# Patient Record
Sex: MALE | Race: OTHER | HISPANIC OR LATINO | Employment: FULL TIME | URBAN - METROPOLITAN AREA
[De-identification: names, ages, dates, MRNs, and addresses within clinical notes are randomized per-mention and may not be internally consistent; named-entity substitution may affect disease eponyms.]

---

## 2024-03-08 ENCOUNTER — OFFICE VISIT (OUTPATIENT)
Age: 44
End: 2024-03-08

## 2024-03-08 VITALS
RESPIRATION RATE: 18 BRPM | HEIGHT: 67 IN | TEMPERATURE: 97 F | HEART RATE: 65 BPM | DIASTOLIC BLOOD PRESSURE: 67 MMHG | SYSTOLIC BLOOD PRESSURE: 119 MMHG | BODY MASS INDEX: 37.04 KG/M2 | WEIGHT: 236 LBS

## 2024-03-08 DIAGNOSIS — Z11.4 SCREENING FOR HUMAN IMMUNODEFICIENCY VIRUS WITHOUT PRESENCE OF RISK FACTORS: ICD-10-CM

## 2024-03-08 DIAGNOSIS — E55.9 VITAMIN D DEFICIENCY: ICD-10-CM

## 2024-03-08 DIAGNOSIS — Z00.00 ENCOUNTER FOR WELL ADULT EXAM WITHOUT ABNORMAL FINDINGS: Primary | ICD-10-CM

## 2024-03-08 DIAGNOSIS — E78.2 MIXED HYPERLIPIDEMIA: ICD-10-CM

## 2024-03-08 DIAGNOSIS — L84 CORN OF FOOT: ICD-10-CM

## 2024-03-08 DIAGNOSIS — Z11.59 ENCOUNTER FOR HCV SCREENING TEST FOR LOW RISK PATIENT: ICD-10-CM

## 2024-03-08 LAB
ABSOLUTE IMMATURE GRANULOCYTE: 0.03 K/UL (ref 0–0.58)
ALBUMIN SERPL-MCNC: 4.1 G/DL (ref 3.5–5.2)
ALP BLD-CCNC: 61 U/L (ref 40–129)
ALT SERPL-CCNC: 16 U/L (ref 0–40)
ANION GAP SERPL CALCULATED.3IONS-SCNC: 10 MMOL/L (ref 7–16)
AST SERPL-CCNC: 19 U/L (ref 0–39)
BASOPHILS ABSOLUTE: 0.1 K/UL (ref 0–0.2)
BASOPHILS RELATIVE PERCENT: 1 % (ref 0–2)
BILIRUB SERPL-MCNC: 0.4 MG/DL (ref 0–1.2)
BUN BLDV-MCNC: 21 MG/DL (ref 6–20)
CALCIUM SERPL-MCNC: 8.9 MG/DL (ref 8.6–10.2)
CHLORIDE BLD-SCNC: 102 MMOL/L (ref 98–107)
CHOLESTEROL: 161 MG/DL
CO2: 28 MMOL/L (ref 22–29)
CREAT SERPL-MCNC: 1.3 MG/DL (ref 0.7–1.2)
EOSINOPHILS ABSOLUTE: 0.27 K/UL (ref 0.05–0.5)
EOSINOPHILS RELATIVE PERCENT: 3 % (ref 0–6)
GFR SERPL CREATININE-BSD FRML MDRD: >60 ML/MIN/1.73M2
GLUCOSE BLD-MCNC: 76 MG/DL (ref 74–99)
HCT VFR BLD CALC: 49.6 % (ref 37–54)
HDLC SERPL-MCNC: 42 MG/DL
HEMOGLOBIN: 15.9 G/DL (ref 12.5–16.5)
IMMATURE GRANULOCYTES: 0 % (ref 0–5)
LDL CHOLESTEROL: 97 MG/DL
LYMPHOCYTES ABSOLUTE: 3.04 K/UL (ref 1.5–4)
LYMPHOCYTES RELATIVE PERCENT: 31 % (ref 20–42)
MCH RBC QN AUTO: 29.6 PG (ref 26–35)
MCHC RBC AUTO-ENTMCNC: 32.1 G/DL (ref 32–34.5)
MCV RBC AUTO: 92.4 FL (ref 80–99.9)
MONOCYTES ABSOLUTE: 1.08 K/UL (ref 0.1–0.95)
MONOCYTES RELATIVE PERCENT: 11 % (ref 2–12)
NEUTROPHILS ABSOLUTE: 5.45 K/UL (ref 1.8–7.3)
NEUTROPHILS RELATIVE PERCENT: 55 % (ref 43–80)
PDW BLD-RTO: 13.8 % (ref 11.5–15)
PLATELET # BLD: 317 K/UL (ref 130–450)
PMV BLD AUTO: 9.9 FL (ref 7–12)
POTASSIUM SERPL-SCNC: 5 MMOL/L (ref 3.5–5)
RBC # BLD: 5.37 M/UL (ref 3.8–5.8)
SODIUM BLD-SCNC: 140 MMOL/L (ref 132–146)
TOTAL PROTEIN: 7 G/DL (ref 6.4–8.3)
TRIGL SERPL-MCNC: 110 MG/DL
VITAMIN D 25-HYDROXY: 12 NG/ML (ref 30–100)
VLDLC SERPL CALC-MCNC: 22 MG/DL
WBC # BLD: 10 K/UL (ref 4.5–11.5)

## 2024-03-08 SDOH — ECONOMIC STABILITY: FOOD INSECURITY: WITHIN THE PAST 12 MONTHS, THE FOOD YOU BOUGHT JUST DIDN'T LAST AND YOU DIDN'T HAVE MONEY TO GET MORE.: NEVER TRUE

## 2024-03-08 SDOH — ECONOMIC STABILITY: FOOD INSECURITY: WITHIN THE PAST 12 MONTHS, YOU WORRIED THAT YOUR FOOD WOULD RUN OUT BEFORE YOU GOT MONEY TO BUY MORE.: NEVER TRUE

## 2024-03-08 SDOH — ECONOMIC STABILITY: HOUSING INSECURITY
IN THE LAST 12 MONTHS, WAS THERE A TIME WHEN YOU DID NOT HAVE A STEADY PLACE TO SLEEP OR SLEPT IN A SHELTER (INCLUDING NOW)?: NO

## 2024-03-08 SDOH — ECONOMIC STABILITY: INCOME INSECURITY: HOW HARD IS IT FOR YOU TO PAY FOR THE VERY BASICS LIKE FOOD, HOUSING, MEDICAL CARE, AND HEATING?: NOT VERY HARD

## 2024-03-08 ASSESSMENT — PATIENT HEALTH QUESTIONNAIRE - PHQ9
SUM OF ALL RESPONSES TO PHQ QUESTIONS 1-9: 2
SUM OF ALL RESPONSES TO PHQ9 QUESTIONS 1 & 2: 2
SUM OF ALL RESPONSES TO PHQ QUESTIONS 1-9: 2
2. FEELING DOWN, DEPRESSED OR HOPELESS: 1
1. LITTLE INTEREST OR PLEASURE IN DOING THINGS: 1

## 2024-03-08 ASSESSMENT — ENCOUNTER SYMPTOMS
SHORTNESS OF BREATH: 0
CONSTIPATION: 0
COUGH: 0
DIARRHEA: 0
VOMITING: 0
WHEEZING: 0
NAUSEA: 0
ABDOMINAL PAIN: 0

## 2024-03-08 NOTE — PATIENT INSTRUCTIONS
Recursos de vivienda de [place]*  (Llame al 211 si necesita más recursos)     [facility]  Lo que ofrecen: alojamiento para familias de edad avanzada, discapacitadas, minusválidas, de ingresos moderados y bajos; asistencia para alquiler en virtud del programa de la Sección 8 (Programa de cupones de elección de vivienda). Llame para solicitar información sobre la solicitud.  Número de teléfono del condado de [place]: [phone]  Sitio web: [employer].  Número de teléfono del CONDADO DE [facility]: [phone]  Sitio web: [email or web address].  Número de teléfono del CONDADO DE [place]: [phone]  Sitio web: [email or web address].    [employer] [facility]:  Lo que ofrecen: analia para víctimas de violencia doméstica en el condado de [place].  Número de teléfono:[phone]  Sitio web: [email or web address].    [facility] DOMESTIC VIOLENCE SERVICES:  Lo que ofrecen: analia para víctimas de violencia doméstica en el condado de [facility]  Número de teléfono:[phone]  Sitio web: [email or web address].    PROGRAMA PARA PERSONAS SIN HOGAR DEL CONDADO DE [place]  Lo que ofrecen: ayuda a las personas o familias sin hogar que carecen de valdo residencia fija o normal por la noche; el analia aloja a las personas sin hogar desde 3 a 30 días. [place] prolongada opcional en función de la situación de la vivienda.  Número de teléfono:[phone], [phone]  Sitio web: [email or web address].      RESCUE M[facility]ION [facility]:  Lo que ofrecen: analia temporal para personas o familias sin hogar.  Teléfono:[phone]  Sitio web: [email or web address].        [facility]:  Lo que ofrecen: analia temporal con comidas calientes para personas sin hogar de 18 años o más y para familias.  Teléfono:[phone]  Sitio web: [employer].  [employer]  Lo que ofrecen: analia de emergencia para la comunidad LGBTQ+ y otros recursos  Contacto:[phone]; [address] [place] [address]  [facility]

## 2024-03-08 NOTE — PROGRESS NOTES
[facility]  DATE OF VISIT : 3/8/2024    Patient : Damian Latham   Age : 43 y.o.    : 1980   MRN : [id number]   ______________________________________________________________________    Chief Complaint :   Chief Complaint   Patient presents with    New Patient     Patient presents today as a new patient to establish care with a PCP. Patient has never seen a Doctor.     Feet Sweat     Patient states he has a lot of feet swelling and itching.     Rash     Patient states he gets red and swelling, sometimes puss around his belly button on some days. Other days it will be fine.        HPI : Damian Latham is 43 y.o. male who presented to the clinic today for NPE.     Periumbilical rash: about once every 1-1.5mos. Starts with periumbilical itching, them turns red with flaky skin and intermittent drainage. Lasts a couple of days at a time and self resolves.     Foot lesion: bilateral feet, he develops callus-like lesions that become painful, he will scratch them off.    I reviewed the patient's past medications, allergies and past medical history during this visit.    Past Medical History :    Health Maintenance-   Colonoscopy- never    History reviewed. No pertinent past medical history.  Past Surgical History:   Procedure Laterality Date    TONSILECTOMY, ADENOIDECTOMY, BILATERAL MYRINGOTOMY AND TUBES         Social History :  Social History       Tobacco History       Smoking Status  Former Smoking Start Date  2000 Current Packs/Day  0.5 packs/day Average Packs/Day  0.5 packs/day for 24.2 years (12.1 ttl pk-yrs) Smoking Tobacco Type  Cigarettes since 2000   Pack Year History     Packs/Day From To Years    0.5 2000  24.2      Passive Exposure  Never      Smokeless Tobacco Use  Never              Alcohol History       Alcohol Use Status  Not Currently Drinks/Week  0 Glasses of wine, 12 Cans of beer, 0 Shots of liquor, 0 Standard drinks or equivalent per week

## 2024-03-11 LAB
HEPATITIS C ANTIBODY: NONREACTIVE
HIV AG/AB: NONREACTIVE

## 2024-03-13 DIAGNOSIS — E55.9 VITAMIN D DEFICIENCY: Primary | ICD-10-CM

## 2024-03-13 RX ORDER — ERGOCALCIFEROL 1.25 MG/1
50000 CAPSULE ORAL WEEKLY
Qty: 12 CAPSULE | Refills: 1 | Status: SHIPPED | OUTPATIENT
Start: 2024-03-13

## 2024-04-24 ENCOUNTER — OFFICE VISIT (OUTPATIENT)
Dept: PODIATRY | Age: 44
End: 2024-04-24
Payer: COMMERCIAL

## 2024-04-24 VITALS — HEIGHT: 67 IN | WEIGHT: 236 LBS | BODY MASS INDEX: 37.04 KG/M2

## 2024-04-24 DIAGNOSIS — B35.3 TINEA PEDIS OF BOTH FEET: ICD-10-CM

## 2024-04-24 DIAGNOSIS — L84 CORNS AND CALLOSITIES: Primary | ICD-10-CM

## 2024-04-24 DIAGNOSIS — M20.42 HAMMER TOES OF BOTH FEET: ICD-10-CM

## 2024-04-24 DIAGNOSIS — M20.41 HAMMER TOES OF BOTH FEET: ICD-10-CM

## 2024-04-24 PROCEDURE — 99203 OFFICE O/P NEW LOW 30 MIN: CPT | Performed by: PODIATRIST

## 2024-04-24 RX ORDER — CLOTRIMAZOLE AND BETAMETHASONE DIPROPIONATE 10; .64 MG/G; MG/G
CREAM TOPICAL
Qty: 45 G | Refills: 1 | Status: SHIPPED | OUTPATIENT
Start: 2024-04-24

## 2024-04-24 RX ORDER — AMMONIUM LACTATE 12 G/100G
LOTION TOPICAL
Qty: 400 G | Refills: 2 | Status: SHIPPED | OUTPATIENT
Start: 2024-04-24

## 2024-04-24 NOTE — PROGRESS NOTES
Patient is here today with complaints of corns/calluses on his feet patient states the left foot bothers him the most. Patient also has complaints of the webs of his feet having cracks. Patient states no there cncerns with his visit today. Nilda Bruce MD seen 3/8/2024 Electronically signed by Noreen Latham MA on 2024 at 10:20 AM       24  Damian Smith Yeison : 1980 Sex: male  Age: 43 y.o.    Patient was referred by Nilda Bruce MD    CC:    Painful feet with callus tissue both feet  Itching between fourth and fifth toe both feet      HPI:   This pleasant 43-year-old male patient referred to me today history callus tissue and tenderness on bottom both feet.  Symptoms present several months.  Does work on his feet.  Does also have redness and itching in between her fourth and fifth toe on both feet.  No recent injury or trauma.  No recent formal treatment.  No additional pedal complaints at this time.    ROS:  Const: Denies constitutional symptoms  Musculo: Denies symptoms other than stated above  Skin: Denies symptoms other than stated above       Current Outpatient Medications:     ammonium lactate (LAC-HYDRIN) 12 % lotion, Apply topically twice daily, Disp: 400 g, Rfl: 2    clotrimazole-betamethasone (LOTRISONE) 1-0.05 % cream, Apply topically 2 times daily., Disp: 45 g, Rfl: 1    vitamin D (ERGOCALCIFEROL) 1.25 MG (91593 UT) CAPS capsule, Take 1 capsule by mouth once a week, Disp: 12 capsule, Rfl: 1  No Known Allergies    No past medical history on file.        Vitals:    24 1017   Weight: 107 kg (236 lb)   Height: 1.702 m (5' 7\")       Work History/Social History:   Foot and ankle history:     Focused Lower Extremity Physical Exam:    Neurovascular examination:    Dorsalis Pedis palpable bilateral.  Posterior tibialis palpable bilateral.    Capillary Refill Time:  Immediate return  Hair growth:  Symmetrical and bilateral   Skin:  Not atrophic  Edema: No edema